# Patient Record
Sex: FEMALE | Race: WHITE
[De-identification: names, ages, dates, MRNs, and addresses within clinical notes are randomized per-mention and may not be internally consistent; named-entity substitution may affect disease eponyms.]

---

## 2018-05-31 ENCOUNTER — HOSPITAL ENCOUNTER (EMERGENCY)
Dept: HOSPITAL 74 - JER | Age: 23
Discharge: HOME | End: 2018-05-31
Payer: COMMERCIAL

## 2018-05-31 VITALS — SYSTOLIC BLOOD PRESSURE: 117 MMHG | HEART RATE: 74 BPM | DIASTOLIC BLOOD PRESSURE: 68 MMHG

## 2018-05-31 VITALS — BODY MASS INDEX: 22.1 KG/M2

## 2018-05-31 VITALS — TEMPERATURE: 98.2 F

## 2018-05-31 DIAGNOSIS — R55: Primary | ICD-10-CM

## 2018-05-31 LAB
ALBUMIN SERPL-MCNC: 3.7 G/DL (ref 3.4–5)
ALP SERPL-CCNC: 58 U/L (ref 45–117)
ALT SERPL-CCNC: 20 U/L (ref 12–78)
ANION GAP SERPL CALC-SCNC: 7 MMOL/L (ref 8–16)
AST SERPL-CCNC: 18 U/L (ref 15–37)
BASOPHILS # BLD: 0.4 % (ref 0–2)
BILIRUB SERPL-MCNC: 0.3 MG/DL (ref 0.2–1)
BUN SERPL-MCNC: 11 MG/DL (ref 7–18)
CALCIUM SERPL-MCNC: 8.2 MG/DL (ref 8.5–10.1)
CHLORIDE SERPL-SCNC: 105 MMOL/L (ref 98–107)
CO2 SERPL-SCNC: 27 MMOL/L (ref 21–32)
CREAT SERPL-MCNC: 1.2 MG/DL (ref 0.55–1.02)
DEPRECATED RDW RBC AUTO: 12.9 % (ref 11.6–15.6)
EOSINOPHIL # BLD: 2 % (ref 0–4.5)
GLUCOSE SERPL-MCNC: 88 MG/DL (ref 74–106)
HCT VFR BLD CALC: 37 % (ref 32.4–45.2)
HGB BLD-MCNC: 12.2 GM/DL (ref 10.7–15.3)
LYMPHOCYTES # BLD: 26.8 % (ref 8–40)
MCH RBC QN AUTO: 28 PG (ref 25.7–33.7)
MCHC RBC AUTO-ENTMCNC: 32.9 G/DL (ref 32–36)
MCV RBC: 85.1 FL (ref 80–96)
MONOCYTES # BLD AUTO: 9.4 % (ref 3.8–10.2)
NEUTROPHILS # BLD: 61.4 % (ref 42.8–82.8)
PLATELET # BLD AUTO: 217 K/MM3 (ref 134–434)
PMV BLD: 10.3 FL (ref 7.5–11.1)
POTASSIUM SERPLBLD-SCNC: 3.7 MMOL/L (ref 3.5–5.1)
PROT SERPL-MCNC: 6.6 G/DL (ref 6.4–8.2)
RBC # BLD AUTO: 4.35 M/MM3 (ref 3.6–5.2)
SODIUM SERPL-SCNC: 139 MMOL/L (ref 136–145)
WBC # BLD AUTO: 7.2 K/MM3 (ref 4–10)

## 2018-05-31 PROCEDURE — 3E033NZ INTRODUCTION OF ANALGESICS, HYPNOTICS, SEDATIVES INTO PERIPHERAL VEIN, PERCUTANEOUS APPROACH: ICD-10-PCS | Performed by: EMERGENCY MEDICINE

## 2018-05-31 PROCEDURE — 3E033GC INTRODUCTION OF OTHER THERAPEUTIC SUBSTANCE INTO PERIPHERAL VEIN, PERCUTANEOUS APPROACH: ICD-10-PCS | Performed by: EMERGENCY MEDICINE

## 2018-05-31 NOTE — PDOC
Attending Attestation





- Resident


Resident Name: Naresh Alejandra





- HPI


HPI: 





05/31/18 15:01


Pt presents to the ED complaining of nausea, lightheadness and presyncope.  

Patient was in her usual state of health until 2 hours ago, when she became 

lightheaded and diaphoretic after having diarrhea.  Diaphoresis resolved, but 

patient is still complaining of persistent lightheadness.  Denies abdominal pain

, chest pain or shortness of breath.  





- Physicial Exam


PE: 





05/31/18 15:07


Agree with resident exam.  Patient is alert and well appearing on my exam.  

Lungs are clear. heart has regular rate and rhythm.  Abdomen is soft, non 

tender and non distended, without guarding or rebound. 





- Medical Decision Making





06/02/18 08:30


PT presents to the ED complaining of diarrhea and lightheadness.  Now reports 

that she ate rice that had been sitting at room temperature for several hours. 

Feels improved after IV hydration and nausea control.  Labs are within normal 

limits.  Will discharge home.

## 2018-05-31 NOTE — PDOC
History of Present Illness





- General


Chief Complaint: Pain


Stated Complaint: WEAKNESS


Time Seen by Provider: 05/31/18 14:32





- History of Present Illness


Initial Comments: 





05/31/18 14:35


Ms. Quiroga is a 24 yo female w/ no pmh who presents for evaluation of sudden 

onset nausea, diaphoresis, and presyncope approximately 20 minutes ago. She 

reports she was feeling in her normal state of health until she went to use the 

bathroom and had all of these symptoms occur during a bowel movement. She has 

had the urge to vomit however has not. BM was solid followed by diarrhea. Ms. Quiroga reports she had leftover rice for lunch earlier today.





The patient denies chest pain, shortness of breath, and headache. Denies fever, 

chills, and constipation. Denies dysuria, frequency, urgency and hematuria. 


Allergies: NKDA





Past History





- Past Medical History


Allergies/Adverse Reactions: 


 Allergies











Allergy/AdvReac Type Severity Reaction Status Date / Time


 


No Known Allergies Allergy   Verified 05/31/18 14:31














- Suicide/Smoking/Psychosocial Hx


Smoking History: Never smoked


Have you smoked in the past 12 months: No


Information on smoking cessation initiated: No


Hx Alcohol Use: No


Drug/Substance Use Hx: No





**Review of Systems





- Review of Systems


Comments:: 





05/31/18 14:37


GENERAL/CONSTITUTIONAL: No fever or chills. No weakness.


HEAD, EYES, EARS, NOSE AND THROAT: No change in vision. No ear pain or 

discharge. No sore throat.


CARDIOVASCULAR: No chest pain or shortness of breath


RESPIRATORY: No cough, wheezing, or hemoptysis.


GASTROINTESTINAL: +Nausea w/ diarrhea and urge to vomit. No constipation.


GENITOURINARY: No dysuria, frequency, or change in urination.


MUSCULOSKELETAL: No joint or muscle swelling or pain. No neck or back pain.


SKIN: No rash


NEUROLOGIC: +Presyncope without loss of consciousness. No headache, vertigo, 

loss of consciousness, or change in strength/sensation.


ENDOCRINE: No increased thirst. No abnormal weight change


HEMATOLOGIC/LYMPHATIC: No anemia, easy bleeding, or history of blood clots.


ALLERGIC/IMMUNOLOGIC: No hives or skin allergy.





*Physical Exam





- Vital Signs


 Last Vital Signs











Temp Pulse Resp BP Pulse Ox


 


 98.2 F   75   16   113/63   100 


 


 05/31/18 14:28  05/31/18 14:28  05/31/18 14:28  05/31/18 14:28  05/31/18 14:28














- Physical Exam


Comments: 





05/31/18 14:37


GENERAL: Awake, alert, and fully oriented, in no acute distress


HEAD: No signs of trauma, normocephalic, atraumatic 


EYES: PERRLA, EOMI, sclera anicteric, conjunctiva clear


ENT: Auricles normal inspection, hearing grossly normal, nares patent, 

oropharynx clear without


exudates. Moist mucosa


NECK: Normal ROM, supple, no lymphadenopathy, JVD, or masses


LUNGS: No distress, speaks full sentences, clear to auscultation bilaterally 


HEART: Regular rate and rhythm, normal S1 and S2, no murmurs, rubs or gallops, 

peripheral pulses normal and equal bilaterally. 


ABDOMEN: +Generalized abdominal discomfort w/out TTP. Otherwise soft, 

normoactive bowel sounds. No guarding, no rebound. No masses


EXTREMITIES: Normal inspection, Normal range of motion, no edema. No clubbing 

or cyanosis. 


NEUROLOGICAL: Cranial nerves II through XII grossly intact. Normal speech, 

normal gait, no focal sensorimotor deficits 


SKIN: Warm, Dry, normal turgor, no rashes or lesions noted. 





ED Treatment Course





- LABORATORY


CBC & Chemistry Diagram: 


 05/31/18 14:33





 05/31/18 14:33





Medical Decision Making





- Medical Decision Making





05/31/18 15:55


Ms. Quiroga is a 24 yo female w/ pmh as described who presents for evaluation 

after presyncopal episode. Patient reporting relief after tylenol, zofran, and 

fluids. Labs grossly wnl as below. Patient not pregnant.





05/31/18 16:06


Discharging patient to home w/ instructions to f/u as needed for further 

evaluation. Patient verbalized understanding and agreement and will comply.








 Laboratory Results - last 24 hr











  05/31/18 05/31/18 05/31/18





  14:33 14:33 14:33


 


WBC  7.2  


 


RBC  4.35  


 


Hgb  12.2  


 


Hct  37.0  


 


MCV  85.1  


 


MCH  28.0  


 


MCHC  32.9  


 


RDW  12.9  


 


Plt Count  217  


 


MPV  10.3  


 


Neutrophils %  61.4  


 


Lymphocytes %  26.8  


 


Monocytes %  9.4  


 


Eosinophils %  2.0  


 


Basophils %  0.4  


 


Nucleated RBC %  0  


 


Sodium    139


 


Potassium    3.7


 


Chloride    105


 


Carbon Dioxide    27


 


Anion Gap    7 L


 


BUN    11


 


Creatinine    1.2 H


 


Creat Clearance w eGFR    55.19


 


Random Glucose    88


 


Calcium    8.2 L


 


Total Bilirubin    0.3


 


AST    18


 


ALT    20


 


Alkaline Phosphatase    58


 


Total Protein    6.6


 


Albumin    3.7


 


Serum Pregnancy, Qual   Negative 














*DC/Admit/Observation/Transfer


Diagnosis at time of Disposition: 


 Pre-syncope





Diarrhea


Qualifiers:


 Diarrhea type: unspecified type Qualified Code(s): R19.7 - Diarrhea, 

unspecified








- Discharge Dispostion


Disposition: HOME





- Referrals





- Patient Instructions


Printed Discharge Instructions:  DI for Diarrhea and Traveler's Diarrhea -- 

Adult, DI for Syncope in Adults (Fainting)


Additional Instructions: 


Please follow-up with primary care provider as needed for further evaluation. 

Return to ER if any return of nausea, vomiting, fever, chills, pain, or other 

concerning symptoms. We hope you feel better soon!





- Post Discharge Activity